# Patient Record
Sex: FEMALE | URBAN - METROPOLITAN AREA
[De-identification: names, ages, dates, MRNs, and addresses within clinical notes are randomized per-mention and may not be internally consistent; named-entity substitution may affect disease eponyms.]

---

## 2017-06-01 ENCOUNTER — APPOINTMENT (RX ONLY)
Dept: URBAN - METROPOLITAN AREA CLINIC 128 | Facility: CLINIC | Age: 82
Setting detail: DERMATOLOGY
End: 2017-06-01

## 2017-06-01 DIAGNOSIS — L81.5 LEUKODERMA, NOT ELSEWHERE CLASSIFIED: ICD-10-CM

## 2017-06-01 DIAGNOSIS — Z71.89 OTHER SPECIFIED COUNSELING: ICD-10-CM

## 2017-06-01 DIAGNOSIS — I78.8 OTHER DISEASES OF CAPILLARIES: ICD-10-CM

## 2017-06-01 DIAGNOSIS — D485 NEOPLASM OF UNCERTAIN BEHAVIOR OF SKIN: ICD-10-CM

## 2017-06-01 DIAGNOSIS — D18.0 HEMANGIOMA: ICD-10-CM

## 2017-06-01 DIAGNOSIS — L81.4 OTHER MELANIN HYPERPIGMENTATION: ICD-10-CM

## 2017-06-01 DIAGNOSIS — L82.1 OTHER SEBORRHEIC KERATOSIS: ICD-10-CM

## 2017-06-01 DIAGNOSIS — D22 MELANOCYTIC NEVI: ICD-10-CM

## 2017-06-01 DIAGNOSIS — D69.2 OTHER NONTHROMBOCYTOPENIC PURPURA: ICD-10-CM

## 2017-06-01 PROBLEM — D48.5 NEOPLASM OF UNCERTAIN BEHAVIOR OF SKIN: Status: ACTIVE | Noted: 2017-06-01

## 2017-06-01 PROBLEM — D18.01 HEMANGIOMA OF SKIN AND SUBCUTANEOUS TISSUE: Status: ACTIVE | Noted: 2017-06-01

## 2017-06-01 PROBLEM — D22.5 MELANOCYTIC NEVI OF TRUNK: Status: ACTIVE | Noted: 2017-06-01

## 2017-06-01 PROCEDURE — 99214 OFFICE O/P EST MOD 30 MIN: CPT | Mod: 25

## 2017-06-01 PROCEDURE — ? COUNSELING

## 2017-06-01 PROCEDURE — 11310 SHAVE SKIN LESION 0.5 CM/<: CPT

## 2017-06-01 PROCEDURE — ? SHAVE REMOVAL

## 2017-06-01 ASSESSMENT — LOCATION ZONE DERM
LOCATION ZONE: NOSE
LOCATION ZONE: VULVA
LOCATION ZONE: ARM
LOCATION ZONE: LEG
LOCATION ZONE: FACE
LOCATION ZONE: TRUNK

## 2017-06-01 ASSESSMENT — LOCATION SIMPLE DESCRIPTION DERM
LOCATION SIMPLE: LEFT THIGH
LOCATION SIMPLE: RIGHT THIGH
LOCATION SIMPLE: RIGHT SHOULDER
LOCATION SIMPLE: GROIN
LOCATION SIMPLE: RIGHT FOREARM
LOCATION SIMPLE: LEFT FOREARM
LOCATION SIMPLE: ABDOMEN
LOCATION SIMPLE: LEFT UPPER BACK
LOCATION SIMPLE: RIGHT PRETIBIAL REGION
LOCATION SIMPLE: CHEST
LOCATION SIMPLE: LEFT CHEEK
LOCATION SIMPLE: LEFT CALF
LOCATION SIMPLE: UPPER BACK
LOCATION SIMPLE: RIGHT CHEEK
LOCATION SIMPLE: LEFT FOREHEAD
LOCATION SIMPLE: NOSE
LOCATION SIMPLE: LEFT BUTTOCK
LOCATION SIMPLE: RIGHT UPPER ARM
LOCATION SIMPLE: RIGHT CALF
LOCATION SIMPLE: LEFT PRETIBIAL REGION
LOCATION SIMPLE: RIGHT UPPER BACK
LOCATION SIMPLE: LEFT BREAST
LOCATION SIMPLE: RIGHT BUTTOCK

## 2017-06-01 ASSESSMENT — LOCATION DETAILED DESCRIPTION DERM
LOCATION DETAILED: LEFT DISTAL DORSAL FOREARM
LOCATION DETAILED: RIGHT DISTAL LATERAL CALF
LOCATION DETAILED: RIGHT PROXIMAL PRETIBIAL REGION
LOCATION DETAILED: RIGHT PROXIMAL CALF
LOCATION DETAILED: LEFT ANTERIOR PROXIMAL THIGH
LOCATION DETAILED: RIGHT VENTRAL MEDIAL DISTAL FOREARM
LOCATION DETAILED: RIGHT DISTAL DORSAL FOREARM
LOCATION DETAILED: RIGHT ANTERIOR PROXIMAL THIGH
LOCATION DETAILED: NASAL DORSUM
LOCATION DETAILED: RIGHT SUPERIOR MEDIAL UPPER BACK
LOCATION DETAILED: EPIGASTRIC SKIN
LOCATION DETAILED: LEFT BUTTOCK
LOCATION DETAILED: LEFT INFERIOR CENTRAL MALAR CHEEK
LOCATION DETAILED: LEFT PROXIMAL PRETIBIAL REGION
LOCATION DETAILED: RIGHT POSTERIOR SHOULDER
LOCATION DETAILED: LEFT SUPERIOR FOREHEAD
LOCATION DETAILED: LEFT VENTRAL DISTAL FOREARM
LOCATION DETAILED: LEFT MEDIAL SUPERIOR CHEST
LOCATION DETAILED: INFERIOR THORACIC SPINE
LOCATION DETAILED: RIGHT CENTRAL MALAR CHEEK
LOCATION DETAILED: SUBXIPHOID
LOCATION DETAILED: LEFT PROXIMAL DORSAL FOREARM
LOCATION DETAILED: MONS PUBIS
LOCATION DETAILED: LEFT LATERAL SUPERIOR CHEST
LOCATION DETAILED: LEFT FOREHEAD
LOCATION DETAILED: LEFT SUPERIOR UPPER BACK
LOCATION DETAILED: RIGHT BUTTOCK
LOCATION DETAILED: LEFT MEDIAL BREAST 10-11:00 REGION
LOCATION DETAILED: RIGHT ANTERIOR DISTAL UPPER ARM
LOCATION DETAILED: RIGHT PROXIMAL DORSAL FOREARM
LOCATION DETAILED: LEFT DISTAL CALF
LOCATION DETAILED: LEFT LATERAL PROXIMAL PRETIBIAL REGION

## 2017-06-01 NOTE — PROCEDURE: MIPS QUALITY
Quality 226: Preventive Care And Screening: Tobacco Use: Screening And Cessation Intervention: Patient screened for tobacco and never smoked
Quality 111:Pneumonia Vaccination Status For Older Adults: Pneumococcal Vaccination Previously Received
Quality 110: Preventive Care And Screening: Influenza Immunization: Influenza Immunization previously received during influenza season
Quality 130: Documentation Of Current Medications In The Medical Record: Current Medications Documented
Quality 47: Advance Care Plan: Advance Care Planning discussed and documented in the medical record; patient did not wish or was not able to name a surrogate decision maker or provide an advance care plan.
Quality 131: Pain Assessment And Follow-Up: Pain assessment using a standardized tool is documented as negative, no follow-up plan required
Detail Level: Detailed

## 2017-06-01 NOTE — PROCEDURE: SHAVE REMOVAL
Render Post-Care Instructions In Note?: no
Consent was obtained from the patient. The risks and benefits to therapy were discussed in detail. Specifically, the risks of infection, scarring, bleeding, prolonged wound healing, incomplete removal, allergy to anesthesia, nerve injury and recurrence were addressed. Prior to the procedure, the treatment site was clearly identified and confirmed by the patient. All components of Universal Protocol/PAUSE Rule completed.
Wound Care: Vaseline
X Size Of Lesion In Cm (Optional): 0.4
Anesthesia Volume In Cc: 0
Anesthesia Type: 1% lidocaine with epinephrine
Size Of Lesion In Cm (Required): 0.5
Biopsy Method: Dermablade
Lab Facility: [facility]
Notification Instructions: Patient will be notified of biopsy results. However, patient instructed to call the office if not contacted within 2 weeks.
Detail Level: Detailed
Post-Care Instructions: I reviewed with the patient in detail post-care instructions. Patient is to keep the biopsy site dry overnight, and then apply bacitracin twice daily until healed. Patient may apply hydrogen peroxide soaks to remove any crusting.
Hemostasis: Drysol
Path Notes (To The Dermatopathologist): Please check margins.
Medical Necessity Clause: This procedure was medically necessary because the lesion that was treated was:
Lab: [facility] [place]
Billing Type: [employer]
Medical Necessity Information: It is in your best interest to select a reason for this procedure from the list below. All of these items fulfill various CMS LCD requirements except the new and changing color options.
Body Location Override (Optional - Billing Will Still Be Based On Selected Body Map Location If Applicable): Left anterior  scalp

## 2018-06-04 ENCOUNTER — APPOINTMENT (RX ONLY)
Dept: URBAN - METROPOLITAN AREA CLINIC 128 | Facility: CLINIC | Age: 83
Setting detail: DERMATOLOGY
End: 2018-06-04

## 2018-06-04 DIAGNOSIS — L81.4 OTHER MELANIN HYPERPIGMENTATION: ICD-10-CM

## 2018-06-04 DIAGNOSIS — D69.2 OTHER NONTHROMBOCYTOPENIC PURPURA: ICD-10-CM

## 2018-06-04 DIAGNOSIS — D18.0 HEMANGIOMA: ICD-10-CM

## 2018-06-04 DIAGNOSIS — L57.0 ACTINIC KERATOSIS: ICD-10-CM

## 2018-06-04 DIAGNOSIS — L81.5 LEUKODERMA, NOT ELSEWHERE CLASSIFIED: ICD-10-CM

## 2018-06-04 DIAGNOSIS — D485 NEOPLASM OF UNCERTAIN BEHAVIOR OF SKIN: ICD-10-CM

## 2018-06-04 DIAGNOSIS — L82.1 OTHER SEBORRHEIC KERATOSIS: ICD-10-CM

## 2018-06-04 DIAGNOSIS — Z71.89 OTHER SPECIFIED COUNSELING: ICD-10-CM

## 2018-06-04 PROBLEM — D18.01 HEMANGIOMA OF SKIN AND SUBCUTANEOUS TISSUE: Status: ACTIVE | Noted: 2018-06-04

## 2018-06-04 PROBLEM — D48.5 NEOPLASM OF UNCERTAIN BEHAVIOR OF SKIN: Status: ACTIVE | Noted: 2018-06-04

## 2018-06-04 PROCEDURE — 99214 OFFICE O/P EST MOD 30 MIN: CPT | Mod: 25

## 2018-06-04 PROCEDURE — ? COUNSELING

## 2018-06-04 PROCEDURE — 17000 DESTRUCT PREMALG LESION: CPT

## 2018-06-04 PROCEDURE — ? SHAVE REMOVAL

## 2018-06-04 PROCEDURE — 11300 SHAVE SKIN LESION 0.5 CM/<: CPT | Mod: 59

## 2018-06-04 PROCEDURE — ? LIQUID NITROGEN

## 2018-06-04 ASSESSMENT — LOCATION SIMPLE DESCRIPTION DERM
LOCATION SIMPLE: SUPERIOR FOREHEAD
LOCATION SIMPLE: RIGHT PRETIBIAL REGION
LOCATION SIMPLE: RIGHT CHEEK
LOCATION SIMPLE: RIGHT UPPER BACK
LOCATION SIMPLE: RIGHT THIGH
LOCATION SIMPLE: ABDOMEN
LOCATION SIMPLE: RIGHT BREAST
LOCATION SIMPLE: LEFT EAR
LOCATION SIMPLE: RIGHT CALF
LOCATION SIMPLE: LEFT CHEEK
LOCATION SIMPLE: LOWER BACK
LOCATION SIMPLE: CHEST
LOCATION SIMPLE: RIGHT FOREARM
LOCATION SIMPLE: LEFT UPPER BACK
LOCATION SIMPLE: LEFT FOREARM
LOCATION SIMPLE: LEFT CALF
LOCATION SIMPLE: LEFT PRETIBIAL REGION
LOCATION SIMPLE: LEFT BREAST
LOCATION SIMPLE: LEFT THIGH

## 2018-06-04 ASSESSMENT — LOCATION DETAILED DESCRIPTION DERM
LOCATION DETAILED: LEFT DISTAL CALF
LOCATION DETAILED: LEFT SCAPHA
LOCATION DETAILED: LOWER STERNUM
LOCATION DETAILED: RIGHT DISTAL CALF
LOCATION DETAILED: LEFT SUPERIOR UPPER BACK
LOCATION DETAILED: PERIUMBILICAL SKIN
LOCATION DETAILED: RIGHT ANTERIOR PROXIMAL THIGH
LOCATION DETAILED: LEFT VENTRAL DISTAL FOREARM
LOCATION DETAILED: RIGHT INFERIOR MEDIAL UPPER BACK
LOCATION DETAILED: LEFT DISTAL DORSAL FOREARM
LOCATION DETAILED: RIGHT SUPERIOR MEDIAL UPPER BACK
LOCATION DETAILED: UPPER STERNUM
LOCATION DETAILED: RIGHT ANTERIOR DISTAL THIGH
LOCATION DETAILED: RIGHT DISTAL PRETIBIAL REGION
LOCATION DETAILED: LEFT LATERAL SUPERIOR CHEST
LOCATION DETAILED: LEFT INFERIOR CENTRAL MALAR CHEEK
LOCATION DETAILED: LEFT MEDIAL BREAST 8-9:00 REGION
LOCATION DETAILED: LEFT ANTERIOR DISTAL THIGH
LOCATION DETAILED: RIGHT INFERIOR CENTRAL MALAR CHEEK
LOCATION DETAILED: RIGHT VENTRAL DISTAL FOREARM
LOCATION DETAILED: RIGHT PROXIMAL PRETIBIAL REGION
LOCATION DETAILED: SUPERIOR MID FOREHEAD
LOCATION DETAILED: SUPERIOR LUMBAR SPINE
LOCATION DETAILED: LEFT PROXIMAL PRETIBIAL REGION
LOCATION DETAILED: RIGHT DISTAL RADIAL DORSAL FOREARM
LOCATION DETAILED: LEFT DISTAL PRETIBIAL REGION
LOCATION DETAILED: RIGHT PROXIMAL DORSAL FOREARM
LOCATION DETAILED: RIGHT MEDIAL UPPER BACK
LOCATION DETAILED: LEFT PROXIMAL DORSAL FOREARM
LOCATION DETAILED: RIGHT MEDIAL BREAST 5-6:00 REGION

## 2018-06-04 ASSESSMENT — LOCATION ZONE DERM
LOCATION ZONE: TRUNK
LOCATION ZONE: ARM
LOCATION ZONE: EAR
LOCATION ZONE: FACE
LOCATION ZONE: LEG

## 2018-06-04 NOTE — PROCEDURE: SHAVE REMOVAL
Bill 78505 For Specimen Handling/Conveyance To Laboratory?: no
Path Notes (To The Dermatopathologist): Please check margins.
Wound Care: Vaseline
Medical Necessity Clause: This procedure was medically necessary because the lesion that was treated was:
Anesthesia Type: 1% lidocaine with epinephrine
Body Location Override (Optional - Billing Will Still Be Based On Selected Body Map Location If Applicable): right thigh
Detail Level: Detailed
Biopsy Method: Dermablade
Billing Type: [employer]
Lab: [facility]
Post-Care Instructions: I reviewed with the patient in detail post-care instructions. Patient is to keep the biopsy site dry overnight, and then apply bacitracin twice daily until healed. Patient may apply hydrogen peroxide soaks to remove any crusting.
Anesthesia Volume In Cc: 0
Hemostasis: Drysol
Was A Bandage Applied: Yes
Consent was obtained from the patient. The risks and benefits to therapy were discussed in detail. Specifically, the risks of infection, scarring, bleeding, prolonged wound healing, incomplete removal, allergy to anesthesia, nerve injury and recurrence were addressed. Prior to the procedure, the treatment site was clearly identified and confirmed by the patient. All components of Universal Protocol/PAUSE Rule completed.
X Size Of Lesion In Cm (Optional): 0.4
Size Of Margin In Cm (Margins Are Not Added To Billing Dimensions): -
Notification Instructions: Patient will be notified of biopsy results. However, patient instructed to call the office if not contacted within 2 weeks.
Medical Necessity Information: It is in your best interest to select a reason for this procedure from the list below. All of these items fulfill various CMS LCD requirements except the new and changing color options.

## 2018-06-04 NOTE — PROCEDURE: LIQUID NITROGEN
Post-Care Instructions: I reviewed with the patient in detail post-care instructions. Patient is to wear sunprotection, and avoid picking at any of the treated lesions. Pt may apply Vaseline to crusted or scabbing areas.
Render Post-Care Instructions In Note?: no
Consent: The patient's consent was obtained including but not limited to risks of crusting, scabbing, blistering, scarring, darker or lighter pigmentary change, recurrence, incomplete removal and infection.
Number Of Freeze-Thaw Cycles: 2 freeze-thaw cycles
Detail Level: Zone
Duration Of Freeze Thaw-Cycle (Seconds): 0

## 2019-12-03 ENCOUNTER — APPOINTMENT (RX ONLY)
Dept: URBAN - METROPOLITAN AREA CLINIC 128 | Facility: CLINIC | Age: 84
Setting detail: DERMATOLOGY
End: 2019-12-03

## 2019-12-03 DIAGNOSIS — L82.0 INFLAMED SEBORRHEIC KERATOSIS: ICD-10-CM

## 2019-12-03 DIAGNOSIS — D18.0 HEMANGIOMA: ICD-10-CM

## 2019-12-03 DIAGNOSIS — L82.1 OTHER SEBORRHEIC KERATOSIS: ICD-10-CM

## 2019-12-03 DIAGNOSIS — L81.4 OTHER MELANIN HYPERPIGMENTATION: ICD-10-CM

## 2019-12-03 DIAGNOSIS — Z71.89 OTHER SPECIFIED COUNSELING: ICD-10-CM

## 2019-12-03 PROBLEM — D18.01 HEMANGIOMA OF SKIN AND SUBCUTANEOUS TISSUE: Status: ACTIVE | Noted: 2019-12-03

## 2019-12-03 PROCEDURE — 99214 OFFICE O/P EST MOD 30 MIN: CPT | Mod: 25

## 2019-12-03 PROCEDURE — ? LIQUID NITROGEN (CM)

## 2019-12-03 PROCEDURE — ? RECOMMENDATIONS

## 2019-12-03 PROCEDURE — 17110 DESTRUCTION B9 LES UP TO 14: CPT

## 2019-12-03 PROCEDURE — ? COUNSELING

## 2019-12-03 ASSESSMENT — LOCATION DETAILED DESCRIPTION DERM
LOCATION DETAILED: RIGHT POSTERIOR SHOULDER
LOCATION DETAILED: RIGHT LATERAL UPPER BACK
LOCATION DETAILED: EPIGASTRIC SKIN
LOCATION DETAILED: RIGHT SUPERIOR LATERAL MIDBACK
LOCATION DETAILED: LEFT LATERAL SUPERIOR CHEST
LOCATION DETAILED: LEFT INFERIOR ANTERIOR NECK
LOCATION DETAILED: LEFT CLAVICULAR SKIN
LOCATION DETAILED: LEFT CLAVICULAR NECK
LOCATION DETAILED: LEFT AXILLARY TAIL OF BREAST
LOCATION DETAILED: RIGHT SUPERIOR UPPER BACK
LOCATION DETAILED: RIGHT INFERIOR LATERAL NECK

## 2019-12-03 ASSESSMENT — LOCATION SIMPLE DESCRIPTION DERM
LOCATION SIMPLE: ABDOMEN
LOCATION SIMPLE: LEFT CLAVICULAR SKIN
LOCATION SIMPLE: RIGHT SHOULDER
LOCATION SIMPLE: LEFT BREAST
LOCATION SIMPLE: CHEST
LOCATION SIMPLE: RIGHT LOWER BACK
LOCATION SIMPLE: RIGHT ANTERIOR NECK
LOCATION SIMPLE: RIGHT UPPER BACK
LOCATION SIMPLE: LEFT ANTERIOR NECK

## 2019-12-03 ASSESSMENT — LOCATION ZONE DERM
LOCATION ZONE: TRUNK
LOCATION ZONE: ARM
LOCATION ZONE: NECK

## 2019-12-03 NOTE — PROCEDURE: LIQUID NITROGEN
Render Post-Care Instructions In Note?: no
Consent: The patient's consent was obtained including but not limited to risks of crusting, scabbing, blistering, scarring, darker or lighter pigmentary change, recurrence, incomplete removal and infection.
Detail Level: Simple
Medical Necessity Information: It is in your best interest to select a reason for this procedure from the list below. All of these items fulfill various CMS LCD requirements except the new and changing color options.
Post-Care Instructions: I reviewed with the patient in detail post-care instructions. Patient is to wear sunprotection, and avoid picking at any of the treated lesions. Pt may apply Vaseline to crusted or scabbing areas.
Medical Necessity Clause: This procedure was medically necessary because the lesions that were treated were:
Duration Of Freeze Thaw-Cycle (Seconds): 0

## 2019-12-03 NOTE — PROCEDURE: MIPS QUALITY
Detail Level: Generalized
Additional Notes: Pain 0/10\\nWill bring in medication list at next visit
Quality 131: Pain Assessment And Follow-Up: Pain assessment using a standardized tool is documented as negative, no follow-up plan required
Quality 130: Documentation Of Current Medications In The Medical Record: Eligible clinician attests to documenting in the medical record the patient is not eligible for a current list of medications being obtained, updated, or reviewed by the eligible clinician

## 2022-06-04 ENCOUNTER — TELEPHONE ENCOUNTER (OUTPATIENT)
Dept: URBAN - METROPOLITAN AREA CLINIC 68 | Facility: CLINIC | Age: 87
End: 2022-06-04

## 2022-06-04 RX ORDER — MESALAMINE 500 MG/1
CAPSULE ORAL
Qty: 360 | Refills: 360 | OUTPATIENT
Start: 2014-03-11 | End: 2015-06-18

## 2022-06-04 RX ORDER — HYOSCYAMINE SULFATE 0.12 MG/1
TABLET ORAL
Qty: 90 | Refills: 90 | OUTPATIENT
Start: 2016-02-29 | End: 2017-10-05

## 2022-06-04 RX ORDER — MESALAMINE 1.2 G/1
TABLET, DELAYED RELEASE ORAL DAILY
Qty: 60 | Refills: 60 | OUTPATIENT
Start: 2016-01-15 | End: 2016-02-12

## 2022-06-04 RX ORDER — PREDNISONE 10 MG/1
TABLET ORAL AS DIRECTED
Qty: 50 | Refills: 0 | OUTPATIENT
Start: 2017-01-09 | End: 2017-01-25

## 2022-06-04 RX ORDER — BUDESONIDE 3 MG/1
CAPSULE, COATED PELLETS ORAL DAILY
Qty: 90 | Refills: 90 | OUTPATIENT
Start: 2017-01-31 | End: 2017-10-05

## 2022-06-04 RX ORDER — LOSARTAN POTASSIUM 25 MG/1
LOSARTAN POTASSIUM( 25MG ORAL  DAILY ) INACTIVE -HX ENTRY TABLET ORAL DAILY
OUTPATIENT
Start: 2016-01-08

## 2022-06-04 RX ORDER — POLYETHYLENE GLYCOL 3350 17 G/DOSE
POWDER (GRAM) ORAL
Qty: 1 | Refills: 0 | OUTPATIENT
Start: 2012-09-10 | End: 2012-10-10

## 2022-06-04 RX ORDER — GEMFIBROZIL 600 MG/1
GEMFIBROZIL( 600MG ORAL  2 TABS DAILY ) INACTIVE -HX ENTRY TABLET, FILM COATED ORAL
OUTPATIENT
Start: 2015-06-18

## 2022-06-04 RX ORDER — HYDROCHLOROTHIAZIDE 12.5 MG/1
HYDROCHLOROTHIAZIDE( 12.5MG ORAL  DAILY ) INACTIVE -HX ENTRY TABLET ORAL DAILY
OUTPATIENT
Start: 2014-10-30

## 2022-06-04 RX ORDER — LOSARTAN POTASSIUM AND HYDROCHLOROTHIAZIDE 50; 12.5 MG/1; MG/1
LOSARTAN POTASSIUM-HCTZ( 50-12.5MG ORAL  QD ) INACTIVE -HX ENTRY TABLET, FILM COATED ORAL QD
OUTPATIENT
Start: 2016-01-08

## 2022-06-04 RX ORDER — BUDESONIDE 3 MG/1
CAPSULE, COATED PELLETS ORAL DAILY
Qty: 90 | Refills: 90 | OUTPATIENT
Start: 2016-05-26 | End: 2017-01-09

## 2022-06-04 RX ORDER — SIMVASTATIN 40 MG/1
SIMVASTATIN( 40MG ORAL  DAILY ) INACTIVE -HX ENTRY TABLET, FILM COATED ORAL DAILY
OUTPATIENT
Start: 2014-10-30

## 2022-06-04 RX ORDER — ESOMEPRAZOLE MAGNESIUM 20 MG/1
NEXIUM( 20MG ORAL  DAILY ) INACTIVE -HX ENTRY GRANULE, DELAYED RELEASE ORAL DAILY
OUTPATIENT
Start: 2015-06-18

## 2022-06-04 RX ORDER — MESALAMINE 400 MG/1
CAPSULE, DELAYED RELEASE ORAL
Qty: 120 | Refills: 120 | OUTPATIENT
Start: 2017-11-30 | End: 2018-01-04

## 2022-06-04 RX ORDER — CARVEDILOL 25 MG/1
CARVEDILOL( 25MG ORAL  DAILY ) INACTIVE -HX ENTRY TABLET, FILM COATED ORAL DAILY
OUTPATIENT
Start: 2016-01-08

## 2022-06-04 RX ORDER — PREDNISONE 20 MG/1
TABLET ORAL AS DIRECTED
Qty: 60 | Refills: 0 | OUTPATIENT
Start: 2016-07-06 | End: 2016-08-05

## 2022-06-04 RX ORDER — GLYBURIDE 1.25 MG/1
GLYBURIDE( 1.25MG ORAL  DAILY ) INACTIVE -HX ENTRY TABLET ORAL DAILY
OUTPATIENT
Start: 2014-01-06

## 2022-06-04 RX ORDER — ALENDRONATE SODIUM 70 MG/1
ALENDRONATE SODIUM( 70MG ORAL  ONCE A WEEK ) INACTIVE -HX ENTRY TABLET ORAL
OUTPATIENT
Start: 2013-03-05

## 2022-06-04 RX ORDER — LEVOTHYROXINE SODIUM 0.07 MG/1
LEVOTHYROXINE SODIUM( 75MCG ORAL  5 DAYS PER WEEK ) INACTIVE -HX ENTRY TABLET ORAL
OUTPATIENT
Start: 2014-10-30

## 2022-06-04 RX ORDER — MESALAMINE 500 MG/1
PENTASA( 500MG ORAL  2 TABS DAILY ) INACTIVE -HX ENTRY CAPSULE ORAL
OUTPATIENT
Start: 2014-10-30

## 2022-06-04 RX ORDER — ROSUVASTATIN CALCIUM 5 MG/1
ROSUVASTATIN CALCIUM( 5MG ORAL  DAILY ) INACTIVE -HX ENTRY TABLET, FILM COATED ORAL DAILY
OUTPATIENT
Start: 2017-10-05

## 2022-06-04 RX ORDER — SIMVASTATIN 40 MG/1
SIMVASTATIN( 40MG ORAL  DAILY ) INACTIVE -HX ENTRY TABLET, FILM COATED ORAL DAILY
OUTPATIENT
Start: 2015-06-18

## 2022-06-04 RX ORDER — OMEPRAZOLE 20 MG/1
OMEPRAZOLE( 20MG ORAL  DAILY ) INACTIVE -HX ENTRY CAPSULE, DELAYED RELEASE ORAL DAILY
OUTPATIENT
Start: 2013-03-05

## 2022-06-04 RX ORDER — BUDESONIDE 9 MG/1
TABLET, EXTENDED RELEASE ORAL
Qty: 10 | Refills: 0 | OUTPATIENT
Start: 2016-02-29 | End: 2016-04-29

## 2022-06-04 RX ORDER — POLYETHYLENE GLYCOL 3350, SODIUM SULFATE, SODIUM CHLORIDE, POTASSIUM CHLORIDE, ASCORBIC ACID, SODIUM ASCORBATE 7.5-2.691G
KIT ORAL
Qty: 1 | Refills: 0 | OUTPATIENT
Start: 2013-03-05 | End: 2013-03-19

## 2022-06-04 RX ORDER — SAXAGLIPTIN 5 MG/1
ONGLYZA( 5MG ORAL  DAILY ) INACTIVE -HX ENTRY TABLET, FILM COATED ORAL DAILY
OUTPATIENT
Start: 2016-01-08

## 2022-06-05 ENCOUNTER — TELEPHONE ENCOUNTER (OUTPATIENT)
Dept: URBAN - METROPOLITAN AREA CLINIC 68 | Facility: CLINIC | Age: 87
End: 2022-06-05

## 2022-06-05 RX ORDER — OMEPRAZOLE 20 MG/1
OMEPRAZOLE( 20MG ORAL  DAILY ) ACTIVE -HX ENTRY TABLET, DELAYED RELEASE ORAL DAILY
Status: ACTIVE | COMMUNITY
Start: 2018-01-04

## 2022-06-05 RX ORDER — BUDESONIDE 9 MG/1
TABLET, EXTENDED RELEASE ORAL DAILY
Qty: 30 | Refills: 30 | Status: ACTIVE | COMMUNITY
Start: 2018-02-02

## 2022-06-05 RX ORDER — METOPROLOL TARTRATE 25 MG/1
METOPROLOL TARTRATE( 25MG ORAL  DAILY ) ACTIVE -HX ENTRY TABLET, FILM COATED ORAL DAILY
Status: ACTIVE | COMMUNITY
Start: 2018-01-04

## 2022-06-05 RX ORDER — NALTREXONE HYDROCHLORIDE 50 MG/1
NALTREXONE HCL( 50MG ORAL 4.5 DAILY ) ACTIVE -HX ENTRY TABLET, FILM COATED ORAL DAILY
Status: ACTIVE | COMMUNITY
Start: 2018-01-04

## 2022-06-05 RX ORDER — CHOLESTYRAMINE 4 G/5.5G
POWDER, FOR SUSPENSION ORAL DAILY
Qty: 30 | Refills: 30 | Status: ACTIVE | COMMUNITY
Start: 2017-10-19

## 2022-06-05 RX ORDER — ZOLPIDEM TARTRATE 5 MG/1
ZOLPIDEM TARTRATE( 5MG ORAL  DAILY ) ACTIVE -HX ENTRY TABLET, FILM COATED ORAL DAILY
Status: ACTIVE | COMMUNITY
Start: 2018-01-04

## 2022-06-05 RX ORDER — AMLODIPINE BESYLATE 5 MG/1
AMLODIPINE BESYLATE( 5MG ORAL  DAILY ) ACTIVE -HX ENTRY TABLET ORAL DAILY
Status: ACTIVE | COMMUNITY
Start: 2018-01-04

## 2022-06-05 RX ORDER — MESALAMINE 375 MG/1
CAPSULE, EXTENDED RELEASE ORAL DAILY
Qty: 120 | Refills: 120 | Status: ACTIVE | COMMUNITY
Start: 2017-12-01

## 2022-06-05 RX ORDER — LORAZEPAM 0.5 MG/1
LORAZEPAM( 0.5MG ORAL  AS NEEDED ) ACTIVE -HX ENTRY TABLET ORAL AS NEEDED
Status: ACTIVE | COMMUNITY
Start: 2018-01-04

## 2022-06-05 RX ORDER — HYDROCHLOROTHIAZIDE 12.5 MG/1
HYDROCHLOROTHIAZIDE( 12.5MG ORAL  DAILY ) ACTIVE -HX ENTRY CAPSULE ORAL DAILY
Status: ACTIVE | COMMUNITY
Start: 2018-01-04

## 2022-06-05 RX ORDER — LEVOTHYROXINE SODIUM 0.05 MG/1
LEVOTHYROXINE SODIUM( 50MCG ORAL  DAILY ) ACTIVE -HX ENTRY TABLET ORAL DAILY
Status: ACTIVE | COMMUNITY
Start: 2018-01-04

## 2022-06-25 ENCOUNTER — TELEPHONE ENCOUNTER (OUTPATIENT)
Age: 87
End: 2022-06-25

## 2022-06-25 RX ORDER — OMEPRAZOLE 20 MG/1
OMEPRAZOLE( 20MG ORAL  DAILY ) INACTIVE -HX ENTRY CAPSULE, DELAYED RELEASE ORAL DAILY
OUTPATIENT
Start: 2013-03-05

## 2022-06-25 RX ORDER — POLYETHYLENE GLYCOL 3350, SODIUM SULFATE, SODIUM CHLORIDE, POTASSIUM CHLORIDE, ASCORBIC ACID, SODIUM ASCORBATE 7.5-2.691G
KIT ORAL
Qty: 1 | Refills: 0 | OUTPATIENT
Start: 2013-03-05 | End: 2013-03-19

## 2022-06-25 RX ORDER — SODIUM SULFATE, POTASSIUM SULFATE, MAGNESIUM SULFATE 17.5; 3.13; 1.6 G/ML; G/ML; G/ML
SOLUTION, CONCENTRATE ORAL AS DIRECTED
Qty: 1 | Refills: 0 | OUTPATIENT
Start: 2016-01-08 | End: 2016-01-09

## 2022-06-25 RX ORDER — ROSUVASTATIN CALCIUM 5 MG/1
ROSUVASTATIN CALCIUM( 5MG ORAL  DAILY ) INACTIVE -HX ENTRY TABLET, FILM COATED ORAL DAILY
OUTPATIENT
Start: 2017-10-05

## 2022-06-25 RX ORDER — MESALAMINE 500 MG/1
PENTASA( 500MG ORAL  2 TABS DAILY ) INACTIVE -HX ENTRY CAPSULE ORAL
OUTPATIENT
Start: 2014-10-30

## 2022-06-25 RX ORDER — MESALAMINE 500 MG/1
CAPSULE ORAL
Qty: 360 | Refills: 360 | OUTPATIENT
Start: 2014-03-11 | End: 2015-06-18

## 2022-06-25 RX ORDER — ALENDRONATE SODIUM 70 MG/1
ALENDRONATE SODIUM( 70MG ORAL  ONCE A WEEK ) INACTIVE -HX ENTRY TABLET ORAL
OUTPATIENT
Start: 2013-03-05

## 2022-06-25 RX ORDER — MESALAMINE 400 MG/1
CAPSULE, DELAYED RELEASE ORAL
Qty: 120 | Refills: 120 | OUTPATIENT
Start: 2017-11-30 | End: 2018-01-04

## 2022-06-25 RX ORDER — BUDESONIDE 3 MG/1
CAPSULE, COATED PELLETS ORAL DAILY
Qty: 90 | Refills: 90 | OUTPATIENT
Start: 2017-01-31 | End: 2017-10-05

## 2022-06-25 RX ORDER — SAXAGLIPTIN 5 MG/1
ONGLYZA( 5MG ORAL  DAILY ) INACTIVE -HX ENTRY TABLET, FILM COATED ORAL DAILY
OUTPATIENT
Start: 2016-01-08

## 2022-06-25 RX ORDER — SIMVASTATIN 40 MG/1
SIMVASTATIN( 40MG ORAL  DAILY ) INACTIVE -HX ENTRY TABLET, FILM COATED ORAL DAILY
OUTPATIENT
Start: 2015-06-18

## 2022-06-25 RX ORDER — HYDROCHLOROTHIAZIDE 12.5 MG/1
HYDROCHLOROTHIAZIDE( 12.5MG ORAL  DAILY ) INACTIVE -HX ENTRY TABLET ORAL DAILY
OUTPATIENT
Start: 2014-10-30

## 2022-06-25 RX ORDER — CARVEDILOL 25 MG/1
CARVEDILOL( 25MG ORAL  DAILY ) INACTIVE -HX ENTRY TABLET, FILM COATED ORAL DAILY
OUTPATIENT
Start: 2016-01-08

## 2022-06-25 RX ORDER — LORATADINE 5 MG
TABLET,CHEWABLE ORAL
Qty: 1 | Refills: 0 | OUTPATIENT
Start: 2012-09-10 | End: 2012-10-10

## 2022-06-25 RX ORDER — BUDESONIDE 9 MG/1
TABLET, EXTENDED RELEASE ORAL
Qty: 10 | Refills: 0 | OUTPATIENT
Start: 2016-02-29 | End: 2016-04-29

## 2022-06-25 RX ORDER — LEVOTHYROXINE SODIUM 0.07 MG/1
LEVOTHYROXINE SODIUM( 75MCG ORAL  5 DAYS PER WEEK ) INACTIVE -HX ENTRY TABLET ORAL
OUTPATIENT
Start: 2014-10-30

## 2022-06-25 RX ORDER — BUDESONIDE 3 MG/1
CAPSULE, COATED PELLETS ORAL DAILY
Qty: 90 | Refills: 90 | OUTPATIENT
Start: 2016-05-26 | End: 2017-01-09

## 2022-06-25 RX ORDER — PREDNISONE 20 MG/1
TABLET ORAL AS DIRECTED
Qty: 60 | Refills: 0 | OUTPATIENT
Start: 2016-07-06 | End: 2016-08-05

## 2022-06-25 RX ORDER — HYOSCYAMINE SULFATE 0.12 MG/1
TABLET ORAL
Qty: 90 | Refills: 90 | OUTPATIENT
Start: 2016-02-29 | End: 2017-10-05

## 2022-06-25 RX ORDER — PREDNISONE 10 MG/1
TABLET ORAL AS DIRECTED
Qty: 50 | Refills: 0 | OUTPATIENT
Start: 2017-01-09 | End: 2017-01-25

## 2022-06-25 RX ORDER — SIMVASTATIN 40 MG/1
SIMVASTATIN( 40MG ORAL  DAILY ) INACTIVE -HX ENTRY TABLET, FILM COATED ORAL DAILY
OUTPATIENT
Start: 2014-10-30

## 2022-06-25 RX ORDER — LOSARTAN POTASSIUM 25 MG/1
LOSARTAN POTASSIUM( 25MG ORAL  DAILY ) INACTIVE -HX ENTRY TABLET, FILM COATED ORAL DAILY
OUTPATIENT
Start: 2016-01-08

## 2022-06-25 RX ORDER — GEMFIBROZIL 600 MG/1
GEMFIBROZIL( 600MG ORAL  2 TABS DAILY ) INACTIVE -HX ENTRY TABLET, FILM COATED ORAL
OUTPATIENT
Start: 2015-06-18

## 2022-06-25 RX ORDER — LOSARTAN POTASSIUM AND HYDROCHLOROTHIAZIDE 50; 12.5 MG/1; MG/1
LOSARTAN POTASSIUM-HCTZ( 50-12.5MG ORAL  QD ) INACTIVE -HX ENTRY TABLET, FILM COATED ORAL QD
OUTPATIENT
Start: 2016-01-08

## 2022-06-25 RX ORDER — ESOMEPRAZOLE MAGNESIUM 20 MG/1
NEXIUM( 20MG ORAL  DAILY ) INACTIVE -HX ENTRY GRANULE, DELAYED RELEASE ORAL DAILY
OUTPATIENT
Start: 2015-06-18

## 2022-06-25 RX ORDER — GLYBURIDE 1.25 MG/1
GLYBURIDE( 1.25MG ORAL  DAILY ) INACTIVE -HX ENTRY TABLET ORAL DAILY
OUTPATIENT
Start: 2014-01-06

## 2022-06-25 RX ORDER — MESALAMINE 1.2 G/1
TABLET, DELAYED RELEASE ORAL DAILY
Qty: 60 | Refills: 60 | OUTPATIENT
Start: 2016-01-15 | End: 2016-02-12

## 2022-06-26 ENCOUNTER — TELEPHONE ENCOUNTER (OUTPATIENT)
Age: 87
End: 2022-06-26

## 2022-06-26 RX ORDER — NALTREXONE HYDROCHLORIDE 50 MG/1
NALTREXONE HCL( 50MG ORAL 4.5 DAILY ) ACTIVE -HX ENTRY TABLET, FILM COATED ORAL DAILY
Status: ACTIVE | COMMUNITY
Start: 2018-01-04

## 2022-06-26 RX ORDER — OMEPRAZOLE 20 MG/1
OMEPRAZOLE( 20MG ORAL  DAILY ) ACTIVE -HX ENTRY TABLET, DELAYED RELEASE ORAL DAILY
Status: ACTIVE | COMMUNITY
Start: 2018-01-04

## 2022-06-26 RX ORDER — ZOLPIDEM TARTRATE 5 MG/1
ZOLPIDEM TARTRATE( 5MG ORAL  DAILY ) ACTIVE -HX ENTRY TABLET ORAL DAILY
Status: ACTIVE | COMMUNITY
Start: 2018-01-04

## 2022-06-26 RX ORDER — LORAZEPAM 0.5 MG/1
LORAZEPAM( 0.5MG ORAL  AS NEEDED ) ACTIVE -HX ENTRY TABLET ORAL AS NEEDED
Status: ACTIVE | COMMUNITY
Start: 2018-01-04

## 2022-06-26 RX ORDER — ERGOCALCIFEROL 1.25 MG/1
VITAMIN D( 50000U ORAL  TWICE A WEEK ) ACTIVE -HX ENTRY CAPSULE ORAL
Status: ACTIVE | COMMUNITY
Start: 2018-01-04

## 2022-06-26 RX ORDER — CHOLESTYRAMINE 4 G/5.5G
POWDER, FOR SUSPENSION ORAL DAILY
Qty: 30 | Refills: 30 | Status: ACTIVE | COMMUNITY
Start: 2017-10-19

## 2022-06-26 RX ORDER — BUDESONIDE 9 MG/1
TABLET, EXTENDED RELEASE ORAL DAILY
Qty: 30 | Refills: 30 | Status: ACTIVE | COMMUNITY
Start: 2018-02-02

## 2022-06-26 RX ORDER — AMLODIPINE BESYLATE 5 MG/1
AMLODIPINE BESYLATE( 5MG ORAL  DAILY ) ACTIVE -HX ENTRY TABLET ORAL DAILY
Status: ACTIVE | COMMUNITY
Start: 2018-01-04

## 2022-06-26 RX ORDER — LEVOTHYROXINE SODIUM 50 UG/1
LEVOTHYROXINE SODIUM( 50MCG ORAL  DAILY ) ACTIVE -HX ENTRY TABLET ORAL DAILY
Status: ACTIVE | COMMUNITY
Start: 2018-01-04

## 2022-06-26 RX ORDER — METOPROLOL TARTRATE 25 MG/1
METOPROLOL TARTRATE( 25MG ORAL  DAILY ) ACTIVE -HX ENTRY TABLET, FILM COATED ORAL DAILY
Status: ACTIVE | COMMUNITY
Start: 2018-01-04

## 2022-06-26 RX ORDER — MESALAMINE 375 MG/1
CAPSULE, EXTENDED RELEASE ORAL DAILY
Qty: 120 | Refills: 120 | Status: ACTIVE | COMMUNITY
Start: 2017-12-01

## 2022-06-26 RX ORDER — ASPIRIN 81 MG/1
LOW-DOSE ASPIRIN( 81MG ORAL  DAILY ) ACTIVE -HX ENTRY TABLET, COATED ORAL DAILY
Status: ACTIVE | COMMUNITY
Start: 2018-01-04

## 2022-06-26 RX ORDER — CHLORHEXIDINE GLUCONATE 4 %
VITAMIN B12 INJECTIONS(    WEEKLY ) ACTIVE -HX ENTRY LIQUID (ML) TOPICAL WEEKLY
Status: ACTIVE | COMMUNITY
Start: 2018-01-04

## 2022-06-26 RX ORDER — HYDROCHLOROTHIAZIDE 12.5 MG/1
HYDROCHLOROTHIAZIDE( 12.5MG ORAL  DAILY ) ACTIVE -HX ENTRY CAPSULE ORAL DAILY
Status: ACTIVE | COMMUNITY
Start: 2018-01-04

## 2023-05-10 ENCOUNTER — APPOINTMENT (RX ONLY)
Dept: URBAN - METROPOLITAN AREA CLINIC 128 | Facility: CLINIC | Age: 88
Setting detail: DERMATOLOGY
End: 2023-05-10

## 2023-05-10 DIAGNOSIS — D18.0 HEMANGIOMA: ICD-10-CM

## 2023-05-10 DIAGNOSIS — D49.2 NEOPLASM OF UNSPECIFIED BEHAVIOR OF BONE, SOFT TISSUE, AND SKIN: ICD-10-CM

## 2023-05-10 DIAGNOSIS — Z71.89 OTHER SPECIFIED COUNSELING: ICD-10-CM

## 2023-05-10 DIAGNOSIS — L82.1 OTHER SEBORRHEIC KERATOSIS: ICD-10-CM

## 2023-05-10 DIAGNOSIS — L81.4 OTHER MELANIN HYPERPIGMENTATION: ICD-10-CM

## 2023-05-10 PROBLEM — D18.01 HEMANGIOMA OF SKIN AND SUBCUTANEOUS TISSUE: Status: ACTIVE | Noted: 2023-05-10

## 2023-05-10 PROCEDURE — ? COUNSELING

## 2023-05-10 PROCEDURE — 99203 OFFICE O/P NEW LOW 30 MIN: CPT | Mod: 25

## 2023-05-10 PROCEDURE — ? BIOPSY BY SHAVE METHOD

## 2023-05-10 PROCEDURE — 11102 TANGNTL BX SKIN SINGLE LES: CPT

## 2023-05-10 ASSESSMENT — LOCATION DETAILED DESCRIPTION DERM
LOCATION DETAILED: LEFT PROXIMAL POSTERIOR UPPER ARM
LOCATION DETAILED: RIGHT PROXIMAL PRETIBIAL REGION
LOCATION DETAILED: LEFT RIB CAGE
LOCATION DETAILED: RIGHT SUPERIOR LATERAL MIDBACK
LOCATION DETAILED: RIGHT SUPERIOR UPPER BACK
LOCATION DETAILED: SUPERIOR THORACIC SPINE
LOCATION DETAILED: LEFT MID-UPPER BACK
LOCATION DETAILED: RIGHT RIB CAGE
LOCATION DETAILED: LEFT SUPERIOR LATERAL UPPER BACK
LOCATION DETAILED: EPIGASTRIC SKIN
LOCATION DETAILED: NASAL DORSUM
LOCATION DETAILED: LEFT FOREHEAD
LOCATION DETAILED: RIGHT POSTERIOR SHOULDER
LOCATION DETAILED: LEFT PROXIMAL DORSAL FOREARM
LOCATION DETAILED: LEFT DISTAL PRETIBIAL REGION
LOCATION DETAILED: RIGHT PROXIMAL DORSAL FOREARM
LOCATION DETAILED: RIGHT CENTRAL ZYGOMA
LOCATION DETAILED: RIGHT LATERAL UPPER BACK
LOCATION DETAILED: LEFT SUPERIOR LATERAL MIDBACK

## 2023-05-10 ASSESSMENT — LOCATION SIMPLE DESCRIPTION DERM
LOCATION SIMPLE: LEFT FOREHEAD
LOCATION SIMPLE: LEFT FOREARM
LOCATION SIMPLE: RIGHT FOREARM
LOCATION SIMPLE: NOSE
LOCATION SIMPLE: LEFT LOWER BACK
LOCATION SIMPLE: ABDOMEN
LOCATION SIMPLE: RIGHT ZYGOMA
LOCATION SIMPLE: UPPER BACK
LOCATION SIMPLE: RIGHT LOWER BACK
LOCATION SIMPLE: RIGHT SHOULDER
LOCATION SIMPLE: LEFT UPPER BACK
LOCATION SIMPLE: RIGHT UPPER BACK
LOCATION SIMPLE: RIGHT PRETIBIAL REGION
LOCATION SIMPLE: LEFT PRETIBIAL REGION
LOCATION SIMPLE: LEFT POSTERIOR UPPER ARM

## 2023-05-10 ASSESSMENT — LOCATION ZONE DERM
LOCATION ZONE: LEG
LOCATION ZONE: TRUNK
LOCATION ZONE: NOSE
LOCATION ZONE: FACE
LOCATION ZONE: ARM

## 2023-05-10 NOTE — PROCEDURE: COUNSELING
Detail Level: Zone
Detail Level: Simple
Detail Level: Generalized
Sunscreen Recommendations: Zinc base sunscreen.

## 2023-05-10 NOTE — PROCEDURE: BIOPSY BY SHAVE METHOD
Detail Level: Detailed
Depth Of Biopsy: dermis
Was A Bandage Applied: Yes
Size Of Lesion In Cm: 1
X Size Of Lesion In Cm: 0
Biopsy Type: H and E
Biopsy Method: Personna blade
Anesthesia Type: 1% lidocaine with epinephrine
Hemostasis: Aluminum Chloride
Wound Care: Polysporin ointment
Dressing: pressure dressing with telfa
Destruction After The Procedure: No
Type Of Destruction Used: Curettage
Cryotherapy Text: The wound bed was treated with cryotherapy after the biopsy was performed.
Electrodesiccation Text: The wound bed was treated with electrodesiccation after the biopsy was performed.
Electrodesiccation And Curettage Text: The wound bed was treated with electrodesiccation and curettage after the biopsy was performed.
Silver Nitrate Text: The wound bed was treated with silver nitrate after the biopsy was performed.
Lab: -[id number]
Lab Facility: [facility]
Consent: verbal consent obtained
Post-Care Instructions: I reviewed with the patient in detail post-care instructions. Patient is to keep the biopsy site dry overnight, and then apply bacitracin twice daily until healed. Patient may apply hydrogen peroxide soaks to remove any crusting.
Notification Instructions: Patient will be notified of biopsy results. However, patient instructed to call the office if not contacted within 2 weeks.
Billing Type: [employer]
Information: Selecting Yes will display possible errors in your note based on the variables you have selected. This validation is only offered as a suggestion for you. PLEASE NOTE THAT THE VALIDATION TEXT WILL BE REMOVED WHEN YOU FINALIZE YOUR NOTE. IF YOU WANT TO FAX A PRELIMINARY NOTE YOU WILL NEED TO TOGGLE THIS TO 'NO' IF YOU DO NOT WANT IT IN YOUR FAXED NOTE.

## 2023-05-18 ENCOUNTER — APPOINTMENT (RX ONLY)
Dept: URBAN - METROPOLITAN AREA CLINIC 116 | Facility: CLINIC | Age: 88
Setting detail: DERMATOLOGY
End: 2023-05-18

## 2023-05-18 DIAGNOSIS — Z01.818 ENCOUNTER FOR OTHER PREPROCEDURAL EXAMINATION: ICD-10-CM

## 2023-05-18 PROCEDURE — ? COUNSELING
